# Patient Record
Sex: FEMALE | Race: WHITE | NOT HISPANIC OR LATINO
[De-identification: names, ages, dates, MRNs, and addresses within clinical notes are randomized per-mention and may not be internally consistent; named-entity substitution may affect disease eponyms.]

---

## 2023-10-04 ENCOUNTER — APPOINTMENT (OUTPATIENT)
Dept: ORTHOPEDIC SURGERY | Facility: CLINIC | Age: 73
End: 2023-10-04
Payer: MEDICARE

## 2023-10-04 VITALS — WEIGHT: 110 LBS | BODY MASS INDEX: 20.24 KG/M2 | HEIGHT: 62 IN

## 2023-10-04 PROBLEM — Z00.00 ENCOUNTER FOR PREVENTIVE HEALTH EXAMINATION: Status: ACTIVE | Noted: 2023-10-04

## 2023-10-04 PROCEDURE — 99203 OFFICE O/P NEW LOW 30 MIN: CPT

## 2023-10-04 RX ORDER — NABUMETONE 500 MG/1
500 TABLET, FILM COATED ORAL
Qty: 60 | Refills: 2 | Status: ACTIVE | COMMUNITY
Start: 2023-10-04 | End: 1900-01-01

## 2023-11-28 ENCOUNTER — APPOINTMENT (OUTPATIENT)
Dept: MRI IMAGING | Facility: CLINIC | Age: 73
End: 2023-11-28
Payer: MEDICARE

## 2023-11-28 ENCOUNTER — OUTPATIENT (OUTPATIENT)
Dept: OUTPATIENT SERVICES | Facility: HOSPITAL | Age: 73
LOS: 1 days | End: 2023-11-28

## 2023-11-28 DIAGNOSIS — R90.89 OTHER ABNORMAL FINDINGS ON DIAGNOSTIC IMAGING OF CENTRAL NERVOUS SYSTEM: ICD-10-CM

## 2023-11-28 PROCEDURE — 70551 MRI BRAIN STEM W/O DYE: CPT | Mod: 26,MH

## 2023-12-27 ENCOUNTER — APPOINTMENT (OUTPATIENT)
Dept: ORTHOPEDIC SURGERY | Facility: CLINIC | Age: 73
End: 2023-12-27
Payer: MEDICARE

## 2023-12-27 DIAGNOSIS — M76.892 OTHER SPECIFIED ENTHESOPATHIES OF LEFT LOWER LIMB, EXCLUDING FOOT: ICD-10-CM

## 2023-12-27 DIAGNOSIS — M70.62 TROCHANTERIC BURSITIS, LEFT HIP: ICD-10-CM

## 2023-12-27 PROCEDURE — 20600 DRAIN/INJ JOINT/BURSA W/O US: CPT | Mod: LT

## 2023-12-27 PROCEDURE — 99213 OFFICE O/P EST LOW 20 MIN: CPT | Mod: 25

## 2023-12-28 NOTE — PHYSICAL EXAM
[de-identified] : Left hip  Constitutional:  The patient is healthy-appearing and in no apparent distress.   Gait: The patient ambulates with a normal gait and no limp.  Cardiovascular System:  There is capillary refill less than 2 seconds.   Skin:  There is no skin abnormalities.  Lumbar Spine; There is no significance malalignment and no tenderness to the paraspinal musculature.  Left Hips:   Bony Palpation:  There is no tenderness of the iliac crest. There is no tenderness of the ASIS. There is no tenderness of the PSIS. There is no tenderness of the SI joint. There is no tenderness of the greater trochanter.   Soft Tissue Palpation:  There is no tenderness of the hip adductors. There is no tenderness of the hip abductors. There is no tenderness of the hamstrings.  There is no tenderness of the piriformis.  There is tenderness of the hip flexors.  Active Range of Motion:  There is full range of motion at the hip actively and passively.  There is no groin pain with hip logroll in seated and supine positioning.  Special Tests:  There is a positive Juan's test. There is a negative Odin-Ilsa test.   Strength:  There is 5/5 hip flexion, adduction, abduction.    Psychiatric:  The patient demonstrates a normal mood and affect and is active and alert.

## 2023-12-28 NOTE — PROCEDURE
[de-identified] : Patient has demonstrated limited relief from NSAIDS, rest, exercises / PT, and after discussion of the risks and benefits, the patient has elected to proceed with a corticosteroid injection into the LEFT greater trochanter bursa. Confirmed that the patient does not have history of prior adverse reactions, active, infections, or relevant allergies.   There was no erythema or warmth, and the skin was clear.  The skin was sterilized with alcohol and via sterile technique, the area with 3 cc of 1% xylocaine and 5mg of Kenalog.  The injection was completed without complication and a bandage was applied.  The patient tolerated the procedure well and was given post-injection instructions.

## 2023-12-28 NOTE — ASSESSMENT
[FreeTextEntry1] : Discussed at length persistent symptoms patient at this time elects cortisone injection week return to physical therapy and home exercise and if no improvement she is to call for MRI evaluation of the hip

## 2024-11-06 ENCOUNTER — APPOINTMENT (OUTPATIENT)
Dept: OTOLARYNGOLOGY | Facility: CLINIC | Age: 74
End: 2024-11-06
Payer: MEDICARE

## 2024-11-06 PROCEDURE — 92557 COMPREHENSIVE HEARING TEST: CPT

## 2024-11-06 PROCEDURE — 92550 TYMPANOMETRY & REFLEX THRESH: CPT | Mod: 52

## 2024-11-08 ENCOUNTER — APPOINTMENT (OUTPATIENT)
Dept: PLASTIC SURGERY | Facility: CLINIC | Age: 74
End: 2024-11-08

## 2024-11-22 ENCOUNTER — APPOINTMENT (OUTPATIENT)
Dept: PHARMACY | Facility: CLINIC | Age: 74
End: 2024-11-22
Payer: SELF-PAY

## 2024-11-22 PROCEDURE — V5010 ASSESSMENT FOR HEARING AID: CPT | Mod: 52

## 2024-12-11 ENCOUNTER — APPOINTMENT (OUTPATIENT)
Dept: PHARMACY | Facility: CLINIC | Age: 74
End: 2024-12-11
Payer: SELF-PAY

## 2024-12-11 PROCEDURE — V5261J: CUSTOM

## 2025-01-02 ENCOUNTER — APPOINTMENT (OUTPATIENT)
Dept: PHARMACY | Facility: CLINIC | Age: 75
End: 2025-01-02
Payer: SELF-PAY

## 2025-01-02 PROCEDURE — V5299A: CUSTOM

## 2025-01-18 ENCOUNTER — EMERGENCY (EMERGENCY)
Facility: HOSPITAL | Age: 75
LOS: 1 days | Discharge: HOME | End: 2025-01-18
Attending: EMERGENCY MEDICINE | Admitting: EMERGENCY MEDICINE
Payer: MEDICARE

## 2025-01-18 VITALS
HEART RATE: 63 BPM | RESPIRATION RATE: 17 BRPM | DIASTOLIC BLOOD PRESSURE: 73 MMHG | TEMPERATURE: 98 F | SYSTOLIC BLOOD PRESSURE: 147 MMHG | OXYGEN SATURATION: 97 %

## 2025-01-18 VITALS
HEIGHT: 61 IN | DIASTOLIC BLOOD PRESSURE: 74 MMHG | OXYGEN SATURATION: 100 % | SYSTOLIC BLOOD PRESSURE: 130 MMHG | RESPIRATION RATE: 18 BRPM | HEART RATE: 65 BPM | TEMPERATURE: 98 F | WEIGHT: 104.94 LBS

## 2025-01-18 LAB
ALBUMIN SERPL ELPH-MCNC: 2.8 G/DL — LOW (ref 3.4–5)
ALP SERPL-CCNC: 108 U/L — SIGNIFICANT CHANGE UP (ref 40–120)
ALT FLD-CCNC: 12 U/L — SIGNIFICANT CHANGE UP (ref 12–42)
ANION GAP SERPL CALC-SCNC: 4 MMOL/L — LOW (ref 9–16)
AST SERPL-CCNC: 19 U/L — SIGNIFICANT CHANGE UP (ref 15–37)
BASOPHILS # BLD AUTO: 0.04 K/UL — SIGNIFICANT CHANGE UP (ref 0–0.2)
BASOPHILS NFR BLD AUTO: 0.5 % — SIGNIFICANT CHANGE UP (ref 0–2)
BILIRUB SERPL-MCNC: 0.4 MG/DL — SIGNIFICANT CHANGE UP (ref 0.2–1.2)
BUN SERPL-MCNC: 21 MG/DL — SIGNIFICANT CHANGE UP (ref 7–23)
CALCIUM SERPL-MCNC: 8.9 MG/DL — SIGNIFICANT CHANGE UP (ref 8.5–10.5)
CHLORIDE SERPL-SCNC: 107 MMOL/L — SIGNIFICANT CHANGE UP (ref 96–108)
CO2 SERPL-SCNC: 29 MMOL/L — SIGNIFICANT CHANGE UP (ref 22–31)
CREAT SERPL-MCNC: 1.17 MG/DL — SIGNIFICANT CHANGE UP (ref 0.5–1.3)
EGFR: 49 ML/MIN/1.73M2 — LOW
EOSINOPHIL # BLD AUTO: 0.03 K/UL — SIGNIFICANT CHANGE UP (ref 0–0.5)
EOSINOPHIL NFR BLD AUTO: 0.4 % — SIGNIFICANT CHANGE UP (ref 0–6)
GLUCOSE SERPL-MCNC: 105 MG/DL — HIGH (ref 70–99)
HCT VFR BLD CALC: 37.9 % — SIGNIFICANT CHANGE UP (ref 34.5–45)
HGB BLD-MCNC: 12.1 G/DL — SIGNIFICANT CHANGE UP (ref 11.5–15.5)
IMM GRANULOCYTES NFR BLD AUTO: 0.4 % — SIGNIFICANT CHANGE UP (ref 0–0.9)
LIDOCAIN IGE QN: 79 U/L — HIGH (ref 16–77)
LYMPHOCYTES # BLD AUTO: 0.8 K/UL — LOW (ref 1–3.3)
LYMPHOCYTES # BLD AUTO: 9.4 % — LOW (ref 13–44)
MCHC RBC-ENTMCNC: 28.1 PG — SIGNIFICANT CHANGE UP (ref 27–34)
MCHC RBC-ENTMCNC: 31.9 G/DL — LOW (ref 32–36)
MCV RBC AUTO: 88.1 FL — SIGNIFICANT CHANGE UP (ref 80–100)
MONOCYTES # BLD AUTO: 0.76 K/UL — SIGNIFICANT CHANGE UP (ref 0–0.9)
MONOCYTES NFR BLD AUTO: 9 % — SIGNIFICANT CHANGE UP (ref 2–14)
NEUTROPHILS # BLD AUTO: 6.81 K/UL — SIGNIFICANT CHANGE UP (ref 1.8–7.4)
NEUTROPHILS NFR BLD AUTO: 80.3 % — HIGH (ref 43–77)
NRBC # BLD: 0 /100 WBCS — SIGNIFICANT CHANGE UP (ref 0–0)
PLATELET # BLD AUTO: 274 K/UL — SIGNIFICANT CHANGE UP (ref 150–400)
POTASSIUM SERPL-MCNC: 3.9 MMOL/L — SIGNIFICANT CHANGE UP (ref 3.5–5.3)
POTASSIUM SERPL-SCNC: 3.9 MMOL/L — SIGNIFICANT CHANGE UP (ref 3.5–5.3)
PROT SERPL-MCNC: 6.1 G/DL — LOW (ref 6.4–8.2)
RBC # BLD: 4.3 M/UL — SIGNIFICANT CHANGE UP (ref 3.8–5.2)
RBC # FLD: 13.6 % — SIGNIFICANT CHANGE UP (ref 10.3–14.5)
SODIUM SERPL-SCNC: 140 MMOL/L — SIGNIFICANT CHANGE UP (ref 132–145)
WBC # BLD: 8.47 K/UL — SIGNIFICANT CHANGE UP (ref 3.8–10.5)
WBC # FLD AUTO: 8.47 K/UL — SIGNIFICANT CHANGE UP (ref 3.8–10.5)

## 2025-01-18 PROCEDURE — 71045 X-RAY EXAM CHEST 1 VIEW: CPT | Mod: 26

## 2025-01-18 PROCEDURE — 74177 CT ABD & PELVIS W/CONTRAST: CPT | Mod: 26

## 2025-01-18 PROCEDURE — 99285 EMERGENCY DEPT VISIT HI MDM: CPT

## 2025-01-18 RX ORDER — LIDOCAINE HYDROCHLORIDE 10 MG/ML
5 INJECTION INFILTRATION; PERINEURAL ONCE
Refills: 0 | Status: COMPLETED | OUTPATIENT
Start: 2025-01-18 | End: 2025-01-18

## 2025-01-18 RX ADMIN — LIDOCAINE HYDROCHLORIDE 5 MILLILITER(S): 10 INJECTION INFILTRATION; PERINEURAL at 19:40

## 2025-01-18 NOTE — ED PROVIDER NOTE - PROGRESS NOTE DETAILS
Care turned over to me @ 2030 by Dr. Tamez pending CT results.  CT shows fecal impaction in rectum without signs of obstruction or stercoral colitis.  Patient wants to go home.  I offered to treat her with PO Miralax or MagCitrate and attempt an enema here, but she prefers to try this at home.  She was discharged with a Fleet's enema & will return if no improvement or if worse abdominal pain, vomiting, hematochezia, etc.

## 2025-01-18 NOTE — ED PROVIDER NOTE - CLINICAL SUMMARY MEDICAL DECISION MAKING FREE TEXT BOX
Pt presents with constipation & fecal impaction.  Labs unremarkable.  CT consistent with fecal impaction without signs of diverticulitis or stercoral colitis or proctitis.  Given enema & MagCitrate and instructed to use MiraLax at home once/day.

## 2025-01-18 NOTE — ED PROVIDER NOTE - CHIEF COMPLAINT
The patient is a 74y Female complaining of  The patient is a 74y Female complaining of constipation.

## 2025-01-18 NOTE — ED ADULT NURSE NOTE - OBJECTIVE STATEMENT
"Pt walk in w/ friend c/o constipation, abdominal discomfort, rectal pain and BRB in toilet while attempting to have a BM this evening. Pt with a h/o dementia, having some difficulty w word finding, but friend states this is her baseline. Difficult triage."    pt stable, not in any acute distress, care ongoing.

## 2025-01-18 NOTE — ED PROVIDER NOTE - GASTROINTESTINAL, MLM
Abdomen soft, non-distended.  Active BS.  Mildly tender in LLQ, but no rebound or guarding..  Rectal exam:  fecal impaction noted.  Stool brown,  with no evidence of blood or melena.

## 2025-01-18 NOTE — ED PROVIDER NOTE - PATIENT PORTAL LINK FT
You can access the FollowMyHealth Patient Portal offered by [facility] by registering at the following website: [email or web address]. By joining [employer]’s FollowMyHealth portal, you will also be able to view your health information using other applications (apps) compatible with our system.

## 2025-01-18 NOTE — ED PROVIDER NOTE - OBJECTIVE STATEMENT
The patient is a 74 yr old female with a hx of dementia who presents with constipation and rectal pain.  She noticed a small amount of BRBPR today.  No melena.  No abdominal pain but mostly rectal discomfort/fullness.  She has not tried an enema or laxatives.  No fevers/chills.  No nausea/vomiting.  No urinary symptoms.  No hx of bowel obstructions in the past.  No hx of fecal impactions, but has chronic constipation.

## 2025-01-18 NOTE — ED PROVIDER NOTE - NSFOLLOWUPINSTRUCTIONS_ED_ALL_ED_FT
Fecal Impaction       A fecal impaction is a large, firm amount of stool (feces) that will not pass out of the body. A fecal impaction usually happens in the rectum, which is in the end of the large intestine. A fecal impaction can block the large intestine and cause significant problems.    What are the causes?  This condition may be caused by anything that slows down bowel movements, including:    The long-term use of laxatives, which are medicines that help you have a bowel movement.  Constipation.  Pain in the rectum. Fecal impaction can occur if you avoid having bowel movements due to the pain. Pain in the rectum can result from a medical condition, such as hemorrhoids or anal fissures.  Narcotic pain-relieving medicines, such as methadone, morphine, or codeine.  Not drinking enough fluids.  Being inactive for a long period of time.  Diseases of the brain or nervous system that damage nerves that control the muscles of the intestines.    This condition may also be caused by dementia.    What are the signs or symptoms?  Common symptoms of this condition include:    A sense of fullness in the rectum but being unable to pass stool.  Changes in bowel patterns. This may include going to the bathroom less often or not at all.  Not having a normal number of bowel movements.  Thin, watery discharge from the rectum.  Pain or cramps in the abdominal area. These often happen after meals.    Other symptoms include:    Urinating often.  Nausea, vomiting, and dehydration.  Dizziness and confusion.  Rapid heartbeat.  Fever and sweating.  Changes in blood pressure.  Breathing problems.    How is this diagnosed?  This condition may be diagnosed based on your symptoms and an exam of your rectum. Sometimes, X-rays or lab tests are done to confirm the diagnosis and to check for other problems.    How is this treated?  This condition may be treated by:    Having your health care provider remove the stool using a gloved finger.  Taking medicine.  Using a suppository or enema in the rectum to soften the stool, which can stimulate a bowel movement.    Follow these instructions at home:      Eating and drinking     Drink enough fluid to keep your urine pale yellow.  Eat foods that are high in fiber, such as beans, whole grains, and fresh fruits and vegetables.  If you begin to get constipated, increase the amount of fiber in your diet.        General instructions    Develop bowel habits. An example of a bowel habit is having a bowel movement right after breakfast every day. Be sure to give yourself enough time on the toilet. This may require using enemas, bowel softeners, or suppositories at home, as directed by your health care provider. It may also include using mineral oil or olive oil.  Do exercises as told by your health care provider.  Take over-the-counter and prescription medicines only as told by your health care provider.  Keep all follow-up visits as told by your health care provider. This is important.    Contact a health care provider if you:  Have ongoing pain in your rectum.  Need to use an enema or a suppository more than 2 times a week.  Have rectal bleeding.  Continue to have problems. The problems may include not being able to go to the bathroom and having long-term constipation.  Have pain in your abdomen.  Have thin, pencil-like stools.    Get help right away if:  You have black or tarry stools.    Summary  A fecal impaction is a large, firm amount of stool (feces) that will not pass out of the body. A fecal impaction can block the large intestine and cause significant problems.  This condition may be caused by anything that slows down bowel movements.  This condition may be treated by having your health care provider remove the stool using a gloved finger, taking medicine, or using a suppository or enema in the rectum to soften the stool.  Develop bowel habits and eat foods that are high in fiber, such as beans, whole grains, and fresh fruits and vegetables.  Contact a health care provider if you continue to have problems. The problems may include not being able to go to the bathroom and having long-term constipation.

## 2025-01-18 NOTE — ED ADULT NURSE NOTE - NSFALLUNIVINTERV_ED_ALL_ED
Bed/Stretcher in lowest position, wheels locked, appropriate side rails in place/Call bell, personal items and telephone in reach/Instruct patient to call for assistance before getting out of bed/chair/stretcher/Non-slip footwear applied when patient is off stretcher/[place] to call system/Physically safe environment - no spills, clutter or unnecessary equipment/Purposeful proactive rounding/Room/bathroom lighting operational, light cord in reach

## 2025-01-18 NOTE — ED ADULT NURSE NOTE - CHIEF COMPLAINT QUOTE
Pt walk in w/ friend c/o constipation, abdominal discomfort, rectal pain and BRB in toilet while attempting to have a BM this evening. Pt with a h/o dementia, having some difficulty w word finding, but friend states this is her baseline. Difficult triage.

## 2025-01-20 NOTE — ED POST DISCHARGE NOTE - RESULT SUMMARY
Patient called the ED Results Line to review her results.  I went over the CT scan results with the patient and discussed the management plan.  The patient was very happy to review the results and appreciated the information we discussed/reviewed.

## 2025-02-21 PROBLEM — F03.90 UNSPECIFIED DEMENTIA, UNSPECIFIED SEVERITY, WITHOUT BEHAVIORAL DISTURBANCE, PSYCHOTIC DISTURBANCE, MOOD DISTURBANCE, AND ANXIETY: Chronic | Status: ACTIVE | Noted: 2025-02-01

## 2025-03-05 ENCOUNTER — APPOINTMENT (OUTPATIENT)
Dept: PHARMACY | Facility: CLINIC | Age: 75
End: 2025-03-05
Payer: MEDICARE

## 2025-03-05 PROCEDURE — V5299A: CUSTOM

## 2025-04-02 ENCOUNTER — APPOINTMENT (OUTPATIENT)
Dept: PHARMACY | Facility: CLINIC | Age: 75
End: 2025-04-02

## 2025-04-09 ENCOUNTER — APPOINTMENT (OUTPATIENT)
Dept: PHARMACY | Facility: CLINIC | Age: 75
End: 2025-04-09
Payer: SELF-PAY

## 2025-04-09 PROCEDURE — V5299A: CUSTOM

## 2025-04-24 ENCOUNTER — APPOINTMENT (OUTPATIENT)
Dept: OTOLARYNGOLOGY | Facility: CLINIC | Age: 75
End: 2025-04-24
Payer: MEDICARE

## 2025-04-24 VITALS
HEIGHT: 62 IN | WEIGHT: 108 LBS | DIASTOLIC BLOOD PRESSURE: 77 MMHG | TEMPERATURE: 98.1 F | HEART RATE: 60 BPM | SYSTOLIC BLOOD PRESSURE: 125 MMHG | OXYGEN SATURATION: 99 % | BODY MASS INDEX: 19.88 KG/M2

## 2025-04-24 DIAGNOSIS — F03.90 UNSPECIFIED DEMENTIA W/OUT BEHAVIORAL DISTURBANCE: ICD-10-CM

## 2025-04-24 DIAGNOSIS — K59.00 CONSTIPATION, UNSPECIFIED: ICD-10-CM

## 2025-04-24 DIAGNOSIS — G47.9 SLEEP DISORDER, UNSPECIFIED: ICD-10-CM

## 2025-04-24 DIAGNOSIS — F45.8 OTHER SOMATOFORM DISORDERS: ICD-10-CM

## 2025-04-24 DIAGNOSIS — F41.9 ANXIETY DISORDER, UNSPECIFIED: ICD-10-CM

## 2025-04-24 DIAGNOSIS — H90.3 SENSORINEURAL HEARING LOSS, BILATERAL: ICD-10-CM

## 2025-04-24 DIAGNOSIS — H61.23 IMPACTED CERUMEN, BILATERAL: ICD-10-CM

## 2025-04-24 DIAGNOSIS — M26.609 UNSPECIFIED TEMPOROMANDIBULAR JOINT DISORDER: ICD-10-CM

## 2025-04-24 DIAGNOSIS — B00.9 HERPESVIRAL INFECTION, UNSPECIFIED: ICD-10-CM

## 2025-04-24 DIAGNOSIS — E07.9 DISORDER OF THYROID, UNSPECIFIED: ICD-10-CM

## 2025-04-24 PROCEDURE — 99204 OFFICE O/P NEW MOD 45 MIN: CPT | Mod: 25

## 2025-04-24 PROCEDURE — 69210 REMOVE IMPACTED EAR WAX UNI: CPT

## 2025-04-24 RX ORDER — ESCITALOPRAM OXALATE 20 MG/1
TABLET ORAL
Refills: 0 | Status: ACTIVE | COMMUNITY

## 2025-04-24 RX ORDER — BUSPIRONE HYDROCHLORIDE 5 MG/1
5 TABLET ORAL
Qty: 30 | Refills: 2 | Status: ACTIVE | COMMUNITY
Start: 2025-04-24 | End: 1900-01-01

## 2025-04-24 RX ORDER — DONEPEZIL HYDROCHLORIDE 23 MG/1
TABLET, FILM COATED ORAL
Refills: 0 | Status: ACTIVE | COMMUNITY

## 2025-04-24 RX ORDER — DEXTROAMPHETAMINE SACCHARATE AND AMPHETAMINE ASPARTATE AND DEXTROAMPHETAMINE SULFATE AND AMPHETAMINE SULFATE 3.75; 3.75; 3.75; 3.75 MG/1; MG/1; MG/1; MG/1
TABLET ORAL
Refills: 0 | Status: ACTIVE | COMMUNITY

## 2025-04-24 RX ORDER — LEVOTHYROXINE SODIUM 0.17 MG/1
TABLET ORAL
Refills: 0 | Status: ACTIVE | COMMUNITY

## 2025-04-24 RX ORDER — MAGNESIUM OXIDE TAB 400 MG (241.3 MG ELEMENTAL MG) 400 (241.3 MG) MG
TAB ORAL
Refills: 0 | Status: ACTIVE | COMMUNITY

## 2025-04-24 RX ORDER — VALACYCLOVIR HYDROCHLORIDE 1 G/1
TABLET, FILM COATED ORAL
Refills: 0 | Status: ACTIVE | COMMUNITY

## 2025-05-01 ENCOUNTER — APPOINTMENT (OUTPATIENT)
Dept: PHARMACY | Facility: CLINIC | Age: 75
End: 2025-05-01
Payer: SELF-PAY

## 2025-05-01 PROCEDURE — V5299A: CUSTOM

## 2025-05-20 ENCOUNTER — APPOINTMENT (OUTPATIENT)
Dept: PHARMACY | Facility: CLINIC | Age: 75
End: 2025-05-20
Payer: SELF-PAY

## 2025-05-20 PROCEDURE — V5264E: CUSTOM

## 2025-06-03 ENCOUNTER — APPOINTMENT (OUTPATIENT)
Dept: PHARMACY | Facility: CLINIC | Age: 75
End: 2025-06-03

## 2025-06-03 PROCEDURE — 92593: CPT | Mod: NC

## 2025-07-02 ENCOUNTER — OUTPATIENT (OUTPATIENT)
Dept: OUTPATIENT SERVICES | Facility: HOSPITAL | Age: 75
LOS: 1 days | End: 2025-07-02

## 2025-07-02 ENCOUNTER — APPOINTMENT (OUTPATIENT)
Dept: CT IMAGING | Facility: CLINIC | Age: 75
End: 2025-07-02
Payer: MEDICARE

## 2025-07-02 PROCEDURE — 70450 CT HEAD/BRAIN W/O DYE: CPT | Mod: 26
